# Patient Record
Sex: MALE | Race: WHITE | NOT HISPANIC OR LATINO | Employment: FULL TIME | URBAN - NONMETROPOLITAN AREA
[De-identification: names, ages, dates, MRNs, and addresses within clinical notes are randomized per-mention and may not be internally consistent; named-entity substitution may affect disease eponyms.]

---

## 2023-12-18 ENCOUNTER — OFFICE VISIT (OUTPATIENT)
Dept: FAMILY MEDICINE CLINIC | Facility: CLINIC | Age: 48
End: 2023-12-18
Payer: COMMERCIAL

## 2023-12-18 VITALS
HEIGHT: 75 IN | DIASTOLIC BLOOD PRESSURE: 100 MMHG | BODY MASS INDEX: 33.2 KG/M2 | TEMPERATURE: 97.5 F | WEIGHT: 267 LBS | HEART RATE: 76 BPM | RESPIRATION RATE: 16 BRPM | OXYGEN SATURATION: 98 % | SYSTOLIC BLOOD PRESSURE: 180 MMHG

## 2023-12-18 DIAGNOSIS — Z00.01 ENCOUNTER FOR GENERAL ADULT MEDICAL EXAMINATION WITH ABNORMAL FINDINGS: ICD-10-CM

## 2023-12-18 DIAGNOSIS — I10 PRIMARY HYPERTENSION: Primary | ICD-10-CM

## 2023-12-18 DIAGNOSIS — K21.9 GASTROESOPHAGEAL REFLUX DISEASE WITHOUT ESOPHAGITIS: ICD-10-CM

## 2023-12-18 DIAGNOSIS — E66.9 OBESITY (BMI 30.0-34.9): ICD-10-CM

## 2023-12-18 PROBLEM — E66.811 OBESITY (BMI 30.0-34.9): Status: ACTIVE | Noted: 2023-12-18

## 2023-12-18 LAB
EXPIRATION DATE: ABNORMAL
Lab: ABNORMAL
POC CREATININE URINE: 0
POC MICROALBUMIN URINE: 20

## 2023-12-18 RX ORDER — OMEPRAZOLE 40 MG/1
40 CAPSULE, DELAYED RELEASE ORAL DAILY
COMMUNITY

## 2023-12-18 RX ORDER — LOSARTAN POTASSIUM AND HYDROCHLOROTHIAZIDE 12.5; 5 MG/1; MG/1
1 TABLET ORAL DAILY
Qty: 90 TABLET | Refills: 1 | Status: SHIPPED | OUTPATIENT
Start: 2023-12-18

## 2023-12-18 RX ORDER — MULTIPLE VITAMINS W/ MINERALS TAB 9MG-400MCG
1 TAB ORAL DAILY
COMMUNITY

## 2023-12-18 NOTE — ASSESSMENT & PLAN NOTE
The patient is here for a get acquainted visit to establish care with a new provider. I have reviewed the patient's medical history in detail and updated the computerized patient record. Baseline screening tests were discussed today and pt agrees to discuss health maintenance and goals in future appointments.

## 2023-12-18 NOTE — PROGRESS NOTES
"     New Patient Office Visit      Patient Name: Christopher Cabrera  : 1975   MRN: [id number]     Chief Complaint:    Chief Complaint   Patient presents with    Annual Exam     Patient is in [facility]ice today for a new patient physical.     Elevated Blood Pressure     Patient states that he went to the dentist to get a tooth fixed but they would not fix it due to blood pressure being elevated.        History of Present Illness: Christopher Cabrera is a 48 y.o. male who is here today for complaints of hypertension and to establish care with a new provider.    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 No pertinent past medical history.    Past Surgical History: History reviewed. No pertinent surgical history.    Family History:   Family History   Problem Relation Age of Onset    Diabetes Maternal Grandfather        Social History:   Social History     Socioeconomic History    Marital status:    Tobacco Use    Smoking status: Former     Packs/day: 1.00     Years: 15.00     Additional pack years: 0.00     Total pack years: 15.00     Types: Cigarettes     Start date:      Quit date: 2023     Years since quittin.0    Smokeless tobacco: Never   Vaping Use    Vaping Use: Never used   Substance and Sexual Activity    Alcohol use: Yes     Alcohol/week: 1.0 standard drink of alcohol     Types: 1 Cans of beer per week    Drug use: Never       Medications:     Current Outpatient Medications:     losartan-hydrochlorothiazide (HYZAAR) 50-12.5 MG per tablet, Take 1 tablet by mouth Daily., Disp: 90 tablet, Rfl: 1    multivitamin with minerals tablet tablet, Take 1 tablet by " "mouth Daily., Disp: , Rfl:     omeprazole (priLOSEC) 40 MG capsule, Take 1 capsule by mouth Daily., Disp: , Rfl:     Allergies:   No Known Allergies    Objective     Physical Exam:    Vital Signs:   Vitals:    12/18/23 0952   BP: 180/100   BP Location: Right arm   Patient Position: Sitting   Cuff Size: Large Adult   Pulse: 76   Resp: 16   Temp: 97.5 °F (36.4 °C)   TempSrc: Oral   SpO2: 98%   Weight: 121 kg (267 lb)   Height: 190.5 cm (75\")   PainSc: 0-No pain     Body mass index is 33.37 kg/m².   BMI is >= 30 and <35. (Class 1 Obesity). The following options were offered after discussion;: information on healthy weight added to patient's after visit summary      Physical Exam  Vitals and nursing note reviewed.   Constitutional:       General: He is not in acute distress.     Appearance: He is not toxic-appearing.   HENT:      Head: Normocephalic and atraumatic.      Right Ear: Tympanic membrane, ear canal and external ear normal.      Left Ear: Ear canal and external ear normal. There is impacted cerumen.      Nose: Nose normal.      Mouth/Throat:      Mouth: Mucous membranes are moist.      Pharynx: No oropharyngeal exudate or posterior oropharyngeal erythema.   Eyes:      Extraocular Movements: Extraocular movements intact.      Conjunctiva/sclera: Conjunctivae normal.      Pupils: Pupils are equal, round, and reactive to light.   Cardiovascular:      Rate and Rhythm: Normal rate and regular rhythm.      Pulses: Normal pulses.      Heart sounds: Normal heart sounds.   Pulmonary:      Effort: Pulmonary effort is normal. No respiratory distress.      Breath sounds: Normal breath sounds.   Abdominal:      General: Bowel sounds are normal. There is no distension.      Palpations: Abdomen is soft.      Tenderness: There is no abdominal tenderness.   Musculoskeletal:         General: Normal range of motion.      Cervical back: Normal range of motion and neck supple. No tenderness.      Right lower leg: No edema.      " Left lower leg: No edema.   Lymphadenopathy:      Cervical: No cervical adenopathy.   Skin:     General: Skin is warm and dry.   Neurological:      Mental Status: He is alert and oriented to person, place, and time.      Motor: No weakness.      Gait: Gait normal.   Psychiatric:         Mood and Affect: Mood normal.         Behavior: Behavior normal.           PHQ-9 Total Score: 0     Assessment / Plan      Assessment/Plan:   Diagnoses and all orders for this visit:    1. Primary hypertension (Primary)  Assessment & Plan:  Hypertension is newly identified.  Medication changes per orders.  Blood pressure will be reassessed in 4 weeks.  Patient reports symptoms of flushed face, headaches, etc. and he has suspected he has had hypertension for quite some time.    Orders:  -     POC Microalbumin  -     TSH Rfx On Abnormal To Free T4  -     losartan-hydrochlorothiazide (HYZAAR) 50-12.5 MG per tablet; Take 1 tablet by mouth Daily.  Dispense: 90 tablet; Refill: 1    2. Gastroesophageal reflux disease without esophagitis  Assessment & Plan:  Pt currently taking omeprazole 40 mg daily and reports good symptom control this medication  No change in treatment plan today    3. Encounter for general adult medical examination with abnormal findings  Assessment & Plan:  The patient is here for a get acquainted visit to establish care with a new provider. I have reviewed the patient's medical history in detail and updated the computerized patient record. Baseline screening tests were discussed today and pt agrees to discuss health maintenance and goals in future appointments.     Orders:  -     CBC Auto Differential  -     Comprehensive Metabolic Panel  -     Lipid Panel    4. Obesity (BMI 30.0-34.9)  Assessment & Plan:  Patient's (Body mass index is 33.37 kg/m².) indicates that they are obese (BMI >30) with health conditions that include hypertension and GERD . Weight is newly identified. BMI  is above average; BMI management plan  is completed. We discussed portion control, increasing exercise, decreasing alcohol consumption, and Information on healthy weight added to patient's after visit summary.     Orders:  -     Hemoglobin A1c    Patient education materials attached to AVS. Materials were reviewed with patient during their office visit today and all questions addressed.       Follow Up:   Return in about 1 month (around 1/18/2024) for Recheck.    LEDA Hernandes (Libby)  [facility] [place] [address]  Phone: [phone]  Fax: [phone]

## 2023-12-18 NOTE — ASSESSMENT & PLAN NOTE
Hypertension is newly identified.  Medication changes per orders.  Blood pressure will be reassessed in 4 weeks.  Patient reports symptoms of flushed face, headaches, etc. and he has suspected he has had hypertension for quite some time.

## 2023-12-18 NOTE — ASSESSMENT & PLAN NOTE
Pt currently taking omeprazole 40 mg daily and reports good symptom control this medication  No change in treatment plan today

## 2023-12-18 NOTE — PATIENT INSTRUCTIONS

## 2023-12-18 NOTE — ASSESSMENT & PLAN NOTE
Patient's (Body mass index is 33.37 kg/m².) indicates that they are obese (BMI >30) with health conditions that include hypertension and GERD . Weight is newly identified. BMI  is above average; BMI management plan is completed. We discussed portion control, increasing exercise, decreasing alcohol consumption, and Information on healthy weight added to patient's after visit summary.

## 2023-12-19 LAB
ALBUMIN SERPL-MCNC: 4.7 G/DL (ref 4.1–5.1)
ALBUMIN/GLOB SERPL: 2.1 {RATIO} (ref 1.2–2.2)
ALP SERPL-CCNC: 77 IU/L (ref 44–121)
ALT SERPL-CCNC: 15 IU/L (ref 0–44)
AST SERPL-CCNC: 14 IU/L (ref 0–40)
BASOPHILS # BLD AUTO: 0 X10E3/UL (ref 0–0.2)
BASOPHILS NFR BLD AUTO: 0 %
BILIRUB SERPL-MCNC: 0.9 MG/DL (ref 0–1.2)
BUN SERPL-MCNC: 11 MG/DL (ref 6–24)
BUN/CREAT SERPL: 14 (ref 9–20)
CALCIUM SERPL-MCNC: 8.9 MG/DL (ref 8.7–10.2)
CHLORIDE SERPL-SCNC: 101 MMOL/L (ref 96–106)
CHOLEST SERPL-MCNC: 204 MG/DL (ref 100–199)
CO2 SERPL-SCNC: 21 MMOL/L (ref 20–29)
CREAT SERPL-MCNC: 0.76 MG/DL (ref 0.76–1.27)
EGFRCR SERPLBLD CKD-EPI 2021: 109 ML/MIN/1.73
EOSINOPHIL # BLD AUTO: 0.1 X10E3/UL (ref 0–0.4)
EOSINOPHIL NFR BLD AUTO: 1 %
ERYTHROCYTE [DISTWIDTH] IN BLOOD BY AUTOMATED COUNT: 12.1 % (ref 11.6–15.4)
GLOBULIN SER CALC-MCNC: 2.2 G/DL (ref 1.5–4.5)
GLUCOSE SERPL-MCNC: 108 MG/DL (ref 70–99)
HBA1C MFR BLD: 5.6 % (ref 4.8–5.6)
HCT VFR BLD AUTO: 45.8 % (ref 37.5–51)
HDLC SERPL-MCNC: 72 MG/DL
HGB BLD-MCNC: 15.5 G/DL (ref 13–17.7)
IMM GRANULOCYTES # BLD AUTO: 0 X10E3/UL (ref 0–0.1)
IMM GRANULOCYTES NFR BLD AUTO: 0 %
LDLC SERPL CALC-MCNC: 118 MG/DL (ref 0–99)
LYMPHOCYTES # BLD AUTO: 2 X10E3/UL (ref 0.7–3.1)
LYMPHOCYTES NFR BLD AUTO: 30 %
MCH RBC QN AUTO: 32 PG (ref 26.6–33)
MCHC RBC AUTO-ENTMCNC: 33.8 G/DL (ref 31.5–35.7)
MCV RBC AUTO: 94 FL (ref 79–97)
MONOCYTES # BLD AUTO: 0.6 X10E3/UL (ref 0.1–0.9)
MONOCYTES NFR BLD AUTO: 9 %
NEUTROPHILS # BLD AUTO: 4 X10E3/UL (ref 1.4–7)
NEUTROPHILS NFR BLD AUTO: 60 %
PLATELET # BLD AUTO: 224 X10E3/UL (ref 150–450)
POTASSIUM SERPL-SCNC: 4.3 MMOL/L (ref 3.5–5.2)
PROT SERPL-MCNC: 6.9 G/DL (ref 6–8.5)
RBC # BLD AUTO: 4.85 X10E6/UL (ref 4.14–5.8)
SODIUM SERPL-SCNC: 138 MMOL/L (ref 134–144)
TRIGL SERPL-MCNC: 81 MG/DL (ref 0–149)
TSH SERPL DL<=0.005 MIU/L-ACNC: 1.68 UIU/ML (ref 0.45–4.5)
VLDLC SERPL CALC-MCNC: 14 MG/DL (ref 5–40)
WBC # BLD AUTO: 6.7 X10E3/UL (ref 3.4–10.8)

## 2024-01-18 ENCOUNTER — OFFICE VISIT (OUTPATIENT)
Dept: FAMILY MEDICINE CLINIC | Facility: CLINIC | Age: 49
End: 2024-01-18
Payer: COMMERCIAL

## 2024-01-18 ENCOUNTER — TELEPHONE (OUTPATIENT)
Dept: FAMILY MEDICINE CLINIC | Facility: CLINIC | Age: 49
End: 2024-01-18

## 2024-01-18 VITALS
HEIGHT: 75 IN | BODY MASS INDEX: 33.81 KG/M2 | OXYGEN SATURATION: 98 % | RESPIRATION RATE: 18 BRPM | SYSTOLIC BLOOD PRESSURE: 140 MMHG | HEART RATE: 76 BPM | DIASTOLIC BLOOD PRESSURE: 88 MMHG | WEIGHT: 271.9 LBS

## 2024-01-18 DIAGNOSIS — M10.9 ACUTE GOUT OF RIGHT FOOT, UNSPECIFIED CAUSE: Primary | ICD-10-CM

## 2024-01-18 DIAGNOSIS — I10 PRIMARY HYPERTENSION: ICD-10-CM

## 2024-01-18 DIAGNOSIS — E78.2 MODERATE MIXED HYPERLIPIDEMIA NOT REQUIRING STATIN THERAPY: ICD-10-CM

## 2024-01-18 RX ORDER — COLCHICINE 0.6 MG/1
TABLET ORAL
Qty: 3 TABLET | Refills: 1 | Status: SHIPPED | OUTPATIENT
Start: 2024-01-18

## 2024-01-18 RX ORDER — COLCHICINE 0.6 MG/1
0.6 TABLET ORAL DAILY
Qty: 30 TABLET | Refills: 0 | Status: CANCELLED | OUTPATIENT
Start: 2024-01-18

## 2024-01-18 RX ORDER — LOSARTAN POTASSIUM AND HYDROCHLOROTHIAZIDE 12.5; 1 MG/1; MG/1
1 TABLET ORAL DAILY
Qty: 30 TABLET | Refills: 1 | Status: SHIPPED | OUTPATIENT
Start: 2024-01-18

## 2024-01-18 NOTE — TELEPHONE ENCOUNTER
Patient called in needing gout medication and an increased blood pressure medication sent in to [facility].

## 2024-01-18 NOTE — PATIENT INSTRUCTIONS
The American Heart Association Diet and Lifestyle Recommendations  A healthy diet and lifestyle are the keys to preventing and managing cardiovascular disease. It’s not as hard as you may think!  Remember, it's the overall pattern of your choices that counts. Make the simple steps below part of your life for long-term benefits to your health and your heart.    Use up at least as many calories as you take in.  Start by knowing how many calories you should be eating and drinking to maintain your weight. Nutrition and calorie information on food labels is typically based on a 2,000 calorie per day diet. You may need fewer or more calories depending on several factors including age, gender, and level of physical activity.    Increase the amount and intensity of your physical activity to burn more calories.    Aim for at least 150 minutes of moderate physical activity or 75 minutes of vigorous physical activity (or an equal combination of both) each week.  Regular physical activity can help you maintain your weight, keep off weight that you lose and reach physical and cardiovascular fitness. If it’s hard to schedule regular exercise, look for ways to build short bursts of activity into your daily routine such as parking farther away and taking the stairs instead of the elevator. Ideally, your activity should be spread throughout the week.    Eat an overall healthy dietary pattern that emphasizes:  a wide variety of fruits and vegetables  whole grains and products made up mostly of whole grains  healthy sources of protein (mostly plants such as legumes and nuts; fish and seafood; low-fat or nonfat dairy; and, if you eat meat and poultry, ensuring it is lean and unprocessed)  liquid non-tropical vegetable oils  minimally processed foods  minimized intake of added sugars  foods prepared with little or no salt  limited or preferably no alcohol intake  Apply this guidance wherever food is prepared or consumed.  It is  possible to follow a heart-healthy dietary pattern regardless of whether food is prepared at home, ordered in a restaurant or online, or purchased as a prepared meal. Read the Nutrition Facts and ingredient list on packaged food labels to choose those with less sodium, added sugars and saturated fat. Look for the Heart-Check yarely to find foods that have been certified by the American Heart Association as heart-healthy.     Live Tobacco Free  Don’t smoke, vape or use tobacco or nicotine products -- and avoid secondhand smoke or vapor.

## 2024-01-18 NOTE — ASSESSMENT & PLAN NOTE
Hypertension is  not at goal .  Medication changes per orders.  Blood pressure will be reassessed in 4 weeks.

## 2024-01-18 NOTE — PROGRESS NOTES
Follow Up Office Visit      Date:  2024   Patient Name: Christopher Cabrera  : 1975   MRN: [id number]     Chief Complaint:    Chief Complaint   Patient presents with    Hypertension     Follow up       History of Present Illness: Christopher Cabrera is a 48 y.o. male who is here today for routine follow-up related to hypertension and patient also has concerns about right foot pain and gout.  Patient denies fevers, chills, or other constitutional symptoms and has no other questions or concerns today.    Patient requests gout medication   Increase bp med  Risk factors - diet/red meat and beer      Answers submitted by the patient for this visit:  Office Visit on 2024 10:00 AM with Bhargavi Bradford (Submitted on 2024)  Please describe your symptoms.: Follow up  Have you had these symptoms before?: No  How long have you been having these symptoms?: Greater than 2 weeks  Please describe any probable cause for these symptoms. : Blood pressure      Subjective      Past Medical History: History reviewed. No pertinent past medical history.    Past Surgical History: History reviewed. No pertinent surgical history.    Family History:   Family History   Problem Relation Age of Onset    Diabetes Maternal Grandfather        Social History:   Social History     Socioeconomic History    Marital status:    Tobacco Use    Smoking status: Former     Packs/day: 1.00     Years: 15.00     Additional pack years: 0.00     Total pack years: 15.00     Types: Cigarettes     Start date:      Quit date: 2023     Years since quittin.1    Smokeless tobacco: Never   Vaping Use    Vaping Use: Never used   Substance and Sexual Activity    Alcohol use: Yes     Alcohol/week: 1.0 standard drink of alcohol     Types: 1 Cans of beer per week    Drug use: Never       Medications:     Current Outpatient Medications:     multivitamin with minerals tablet tablet, Take 1 tablet by mouth Daily., Disp: , Rfl:      "omeprazole (priLOSEC) 40 MG capsule, Take 1 capsule by mouth Daily., Disp: , Rfl:     colchicine 0.6 MG tablet, Take 1.2 mg PO x 1 dose (2 tablets), then 0.6 mg PO 1 hour later x 1 dose (1 tablet). Max: 1.8 mg total dose/treatment course (3 tablets). Do not repeat for at least 3 days., Disp: 3 tablet, Rfl: 1    losartan-hydrochlorothiazide (HYZAAR) 100-12.5 MG per tablet, Take 1 tablet by mouth Daily., Disp: 30 tablet, Rfl: 1    Allergies:   No Known Allergies    Objective     Physical Exam  Vitals and nursing note reviewed.   Constitutional:       General: He is not in acute distress.     Appearance: Normal appearance. He is not toxic-appearing.   HENT:      Head: Normocephalic and atraumatic.   Cardiovascular:      Rate and Rhythm: Normal rate and regular rhythm.      Pulses: Normal pulses.      Heart sounds: Normal heart sounds.   Pulmonary:      Effort: Pulmonary effort is normal. No respiratory distress.      Breath sounds: Normal breath sounds.   Musculoskeletal:         General: Normal range of motion.      Right foot: Normal range of motion.      Left foot: Normal range of motion.   Feet:      Right foot:      Skin integrity: Skin integrity normal.      Left foot:      Skin integrity: Skin integrity normal.      Comments: Arch of right foot and ankle mild tenderness to touch, minimal erythema, normal temperature.  Left foot no abnormality.  Skin:     General: Skin is warm and dry.   Neurological:      General: No focal deficit present.      Mental Status: He is alert and oriented to person, place, and time.      Motor: No weakness.      Coordination: Coordination normal.   Psychiatric:         Mood and Affect: Mood normal.         Behavior: Behavior normal.       Vitals:    01/18/24 1005   BP: 140/88   BP Location: Left arm   Patient Position: Sitting   Cuff Size: Large Adult   Pulse: 76   Resp: 18   SpO2: 98%   Weight: 123 kg (271 lb 14.4 oz)   Height: 190.5 cm (75\")   PainSc: 0-No pain     Body mass index " is 33.99 kg/m².           The 10-year ASCVD risk score (Alex PERALTA, et al., 2019) is: 3.3%    Values used to calculate the score:      Age: 48 years      Sex: Male      Is Non- : No      Diabetic: No      Tobacco smoker: No      Systolic Blood Pressure: 140 mmHg      Is BP treated: Yes      HDL Cholesterol: 72 mg/dL      Total Cholesterol: 204 mg/dL    PHQ-9 Total Score:       Assessment / Plan      Assessment/Plan:   Diagnoses and all orders for this visit:    1. Acute gout of right foot, unspecified cause (Primary)  Assessment & Plan:  Gout flare arch of right foot into ankle.  Patient reports symptoms for the last 2 to 3 days increasing in severity.  Patient reports taking anti-inflammatory medication with little relief.  Medication changes per orders  Patient agrees to return if no improvement or worsening symptoms or if he develops constitutional symptoms i.e. fever, chills, etc.  Patient agrees to take over-the-counter NSAID  Will reassess symptoms at next follow-up visit.  Patient education materials attached to AVS - gout and prevention of gout. Materials were reviewed with patient during their office visit today and all questions addressed.    Orders:  -     colchicine 0.6 MG tablet; Take 1.2 mg PO x 1 dose (2 tablets), then 0.6 mg PO 1 hour later x 1 dose (1 tablet). Max: 1.8 mg total dose/treatment course (3 tablets). Do not repeat for at least 3 days.  Dispense: 3 tablet; Refill: 1    2. Primary hypertension  Assessment & Plan:  Hypertension is  not at goal .  Medication changes per orders.  Blood pressure will be reassessed in 4 weeks.    Orders:  -     losartan-hydrochlorothiazide (HYZAAR) 100-12.5 MG per tablet; Take 1 tablet by mouth Daily.  Dispense: 30 tablet; Refill: 1    3. Moderate mixed hyperlipidemia not requiring statin therapy    Follow Up:   Return in about 4 weeks (around 2/15/2024).    LEDA Hernandes (Libby)  [facility] [address] [place] [address]

## 2024-01-18 NOTE — ASSESSMENT & PLAN NOTE
Gout flare arch of right foot into ankle.  Patient reports symptoms for the last 2 to 3 days increasing in severity.  Patient reports taking anti-inflammatory medication with little relief.  Medication changes per orders  Patient agrees to return if no improvement or worsening symptoms or if he develops constitutional symptoms i.e. fever, chills, etc.  Patient agrees to take over-the-counter NSAID  Will reassess symptoms at next follow-up visit.  Patient education materials attached to AVS - gout and prevention of gout. Materials were reviewed with patient during their office visit today and all questions addressed.

## 2024-02-02 ENCOUNTER — TELEPHONE (OUTPATIENT)
Dept: FAMILY MEDICINE CLINIC | Facility: CLINIC | Age: 49
End: 2024-02-02
Payer: COMMERCIAL

## 2024-02-02 NOTE — TELEPHONE ENCOUNTER
Dental office called asking to speak to MA or provider to okay a root canal. Dental office states that the patients blood pressure is 162/107 and they are concerned that the epi in the anaesthetic will make his blood pressure increase. Spoke with Floresita Potter and Dr. Velasquez. Floresita potter advised dental office that if they can do the procedure without epi then that would be preferred. Provider also advised that the patients blood pressure may be increased due to the patient needing the procedure. Provider advised the okay to go ahead with the procedure.

## 2024-02-15 ENCOUNTER — OFFICE VISIT (OUTPATIENT)
Dept: FAMILY MEDICINE CLINIC | Facility: CLINIC | Age: 49
End: 2024-02-15
Payer: COMMERCIAL

## 2024-02-15 VITALS
OXYGEN SATURATION: 98 % | WEIGHT: 268.2 LBS | HEART RATE: 94 BPM | DIASTOLIC BLOOD PRESSURE: 90 MMHG | HEIGHT: 75 IN | SYSTOLIC BLOOD PRESSURE: 120 MMHG | RESPIRATION RATE: 18 BRPM | BODY MASS INDEX: 33.35 KG/M2

## 2024-02-15 DIAGNOSIS — Z11.59 NEED FOR HEPATITIS C SCREENING TEST: ICD-10-CM

## 2024-02-15 DIAGNOSIS — I10 PRIMARY HYPERTENSION: ICD-10-CM

## 2024-02-15 DIAGNOSIS — Z12.11 ENCOUNTER FOR SCREENING FOR MALIGNANT NEOPLASM OF COLON: ICD-10-CM

## 2024-02-15 DIAGNOSIS — N52.9 ERECTILE DYSFUNCTION, UNSPECIFIED ERECTILE DYSFUNCTION TYPE: Primary | ICD-10-CM

## 2024-02-15 DIAGNOSIS — K21.9 GASTROESOPHAGEAL REFLUX DISEASE WITHOUT ESOPHAGITIS: ICD-10-CM

## 2024-02-15 RX ORDER — LOSARTAN POTASSIUM 100 MG/1
100 TABLET ORAL DAILY
Qty: 30 TABLET | Refills: 3 | Status: SHIPPED | OUTPATIENT
Start: 2024-02-15

## 2024-02-15 RX ORDER — AMLODIPINE BESYLATE 5 MG/1
5 TABLET ORAL DAILY
Qty: 30 TABLET | Refills: 3 | Status: SHIPPED | OUTPATIENT
Start: 2024-02-15

## 2024-02-15 RX ORDER — OMEPRAZOLE 40 MG/1
40 CAPSULE, DELAYED RELEASE ORAL DAILY
Qty: 30 CAPSULE | Refills: 3 | Status: SHIPPED | OUTPATIENT
Start: 2024-02-15

## 2024-02-15 NOTE — PATIENT INSTRUCTIONS

## 2024-02-15 NOTE — PROGRESS NOTES
Follow Up Office Visit      Date:  02/15/2024   Patient Name: Christopher Cabrera  : 1975   MRN: [id number]     Chief Complaint:    Chief Complaint   Patient presents with    Hypertension     Follow up        History of Present Illness: Christopher Cabrera is a 48 y.o. male who is here today to follow up for re-check of HTN. Pt is also concerned about erectile dysfunction.     Decrease DBP   Clearance for dental procedure  Endomethacin refill for gout flares - 2-4 times a year average     Erectile Dysfunction: Patient complains of erectile dysfunction.  Onset of dysfunction was 3 months ago and was gradual in onset.  Patient states the nature of difficulty is both attaining and maintaining erection. Full erections occur never. Partial erections occur with intercourse. Libido is not affected. Risk factors for ED include cardiovascular disease and antihypertensive medications,      Subjective      Past Medical History: History reviewed. No pertinent past medical history.    Past Surgical History: History reviewed. No pertinent surgical history.    Family History:   Family History   Problem Relation Age of Onset    Diabetes Maternal Grandfather        Social History:   Social History     Socioeconomic History    Marital status:    Tobacco Use    Smoking status: Former     Current packs/day: 0.00     Average packs/day: 1 pack/day for 15.9 years (15.9 ttl pk-yrs)     Types: Cigarettes     Start date:      Quit date: 2023     Years since quittin.2    Smokeless tobacco: Never   Vaping Use    Vaping status: Never Used   Substance and Sexual Activity    Alcohol use: Yes     Alcohol/week: 1.0 standard drink of alcohol     Types: 1 Cans of beer per week    Drug use: Never       Medications:     Current Outpatient Medications:     colchicine 0.6 MG tablet, Take 1.2 mg PO x 1 dose (2 tablets), then 0.6 mg PO 1 hour later x 1 dose (1 tablet). Max: 1.8 mg total dose/treatment course (3 tablets). Do not repeat  "for at least 3 days., Disp: 3 tablet, Rfl: 1    multivitamin with minerals tablet tablet, Take 1 tablet by mouth Daily., Disp: , Rfl:     omeprazole (priLOSEC) 40 MG capsule, Take 1 capsule by mouth Daily., Disp: 30 capsule, Rfl: 3    amLODIPine (NORVASC) 5 MG tablet, Take 1 tablet by mouth Daily., Disp: 30 tablet, Rfl: 3    losartan (COZAAR) 100 MG tablet, Take 1 tablet by mouth Daily., Disp: 30 tablet, Rfl: 3    Allergies:   No Known Allergies    Objective     Physical Exam  Vitals:    02/15/24 0949   BP: 120/90   BP Location: Left arm   Patient Position: Sitting   Cuff Size: Large Adult   Pulse: 94   Resp: 18   SpO2: 98%   Weight: 122 kg (268 lb 3.2 oz)   Height: 190.5 cm (75\")   PainSc: 0-No pain     Body mass index is 33.52 kg/m².          The 10-year ASCVD risk score (Alex PERALTA, et al., 2019) is: 2.5%    Values used to calculate the score:      Age: 48 years      Sex: Male      Is Non- : No      Diabetic: No      Tobacco smoker: No      Systolic Blood Pressure: 120 mmHg      Is BP treated: Yes      HDL Cholesterol: 72 mg/dL      Total Cholesterol: 204 mg/dL    PHQ-9 Total Score:       Assessment / Plan      Assessment/Plan:   Diagnoses and all orders for this visit:    1. Erectile dysfunction, unspecified erectile dysfunction type (Primary)  -     Testosterone; Future    2. Gastroesophageal reflux disease without esophagitis  Assessment & Plan:  Patient reports adequate symptom control on current treatment regimen.  No change in treatment regimen today, will reevaluate at next routine follow-up.  Refill per order    Orders:  -     omeprazole (priLOSEC) 40 MG capsule; Take 1 capsule by mouth Daily.  Dispense: 30 capsule; Refill: 3    3. Encounter for screening for malignant neoplasm of colon  -     Ambulatory Referral For Screening Colonoscopy    4. Need for hepatitis C screening test  -     Hepatitis C Antibody; Future    5. Primary hypertension  Assessment & Plan:  Hypertension is  " improving with treatment.  Medication changes per orders.  Blood pressure will be reassessedin 4 weeks.    Orders:  -     losartan (COZAAR) 100 MG tablet; Take 1 tablet by mouth Daily.  Dispense: 30 tablet; Refill: 3  -     amLODIPine (NORVASC) 5 MG tablet; Take 1 tablet by mouth Daily.  Dispense: 30 tablet; Refill: 3       Follow Up:   Return in about 4 weeks (around 3/14/2024) for Recheck.    LEDA Hernandes (Libby)  [facility] [address] [address]

## 2024-02-15 NOTE — ASSESSMENT & PLAN NOTE
Hypertension is  improving with treatment.  Medication changes per orders.  Blood pressure will be reassessedin 4 weeks.

## 2024-02-15 NOTE — LETTER
February 15, 2024       Patient: Christopher Cabrera   YOB: 1975   Date of Visit: 2/15/2024       To Whom It May Concern,    Christopher Cabrera was in my office today for a routine follow-up.  We are currently treating his hypertension and have been titrating medications with a goal of keeping his blood pressure < 130/80 to prevent target organ damage and other complications related to chronic, poorly controlled hypertension.  As long as the patient is compliant with his antihypertensive medications and he meets your blood pressure screening criteria on the day of his procedure, he should be at no greater risk during the procedure.     If you have questions, please do not hesitate to call me. I look forward to following Christopher along with you.         Sincerely,        LEDA Abraham        CC:   No Recipients

## 2024-02-22 ENCOUNTER — TELEPHONE (OUTPATIENT)
Dept: FAMILY MEDICINE CLINIC | Facility: CLINIC | Age: 49
End: 2024-02-22
Payer: COMMERCIAL

## 2024-02-22 NOTE — TELEPHONE ENCOUNTER
Called patient and advised that letter for the dentist was sent through [employer]. Advised patient that if he cannot access it to give us a call back and I can print it off for him to . Patient voiced understanding.

## 2024-03-04 NOTE — ASSESSMENT & PLAN NOTE
Patient reports adequate symptom control on current treatment regimen.  No change in treatment regimen today, will reevaluate at next routine follow-up.  Refill per order

## 2024-03-06 ENCOUNTER — TELEPHONE (OUTPATIENT)
Dept: FAMILY MEDICINE CLINIC | Facility: CLINIC | Age: 49
End: 2024-03-06
Payer: COMMERCIAL

## 2024-03-06 NOTE — TELEPHONE ENCOUNTER
Patient's original call was request for indomethacin for gout flares.  In discussing this with patient on the phone he tells me has had some increased swelling in his ankles since changing antihypertensive medications at his last visit.  Patient is planning to come to the office on 3/9/2024 for routine follow-up lab work and has appointment to be seen the following week.  Plan is to evaluate lab work and make medication adjustments at follow-up appointment.  Patient agrees to follow-up earlier if he has additional concerns.  We will discuss gout and indomethacin at his appointment, patient verbalized understanding.

## 2024-03-08 ENCOUNTER — LAB (OUTPATIENT)
Dept: FAMILY MEDICINE CLINIC | Facility: CLINIC | Age: 49
End: 2024-03-08
Payer: COMMERCIAL

## 2024-03-08 DIAGNOSIS — N52.9 ERECTILE DYSFUNCTION, UNSPECIFIED ERECTILE DYSFUNCTION TYPE: ICD-10-CM

## 2024-03-08 DIAGNOSIS — Z11.59 NEED FOR HEPATITIS C SCREENING TEST: ICD-10-CM

## 2024-03-09 LAB
HCV IGG SERPL QL IA: NON REACTIVE
TESTOST SERPL-MCNC: 398 NG/DL (ref 264–916)

## 2024-03-14 ENCOUNTER — OFFICE VISIT (OUTPATIENT)
Dept: FAMILY MEDICINE CLINIC | Facility: CLINIC | Age: 49
End: 2024-03-14
Payer: COMMERCIAL

## 2024-03-14 VITALS
OXYGEN SATURATION: 100 % | SYSTOLIC BLOOD PRESSURE: 160 MMHG | BODY MASS INDEX: 34.06 KG/M2 | RESPIRATION RATE: 16 BRPM | TEMPERATURE: 98.3 F | DIASTOLIC BLOOD PRESSURE: 94 MMHG | HEART RATE: 84 BPM | HEIGHT: 75 IN | WEIGHT: 273.9 LBS

## 2024-03-14 DIAGNOSIS — I10 PRIMARY HYPERTENSION: Primary | ICD-10-CM

## 2024-03-14 PROCEDURE — 36415 COLL VENOUS BLD VENIPUNCTURE: CPT

## 2024-03-14 RX ORDER — CETIRIZINE HYDROCHLORIDE 10 MG/1
1 TABLET ORAL DAILY
COMMUNITY

## 2024-03-14 RX ORDER — FUROSEMIDE 20 MG/1
20 TABLET ORAL DAILY
Qty: 10 TABLET | Refills: 0 | Status: SHIPPED | OUTPATIENT
Start: 2024-03-14

## 2024-03-14 NOTE — PROGRESS NOTES
Follow Up Office Visit      Date:  2024   Patient Name: Christopher Cabrera  : 1975   MRN: [id number]     Chief Complaint:    Chief Complaint   Patient presents with    Hypertension     Follow up        History of Present Illness: Christopher Cabrera is a 49 y.o. male who is here today for blood pressure recheck.  Patient's medications were changed at his last visit and today he has experienced increased swelling in his lower extremities and blood pressure is increased.  Patient denies fevers, chills, or other constitutional symptoms and has no other questions or concerns at this time.    Notes:  Increased swelling in bilateral ankles  Patient was previously taken off hydrochlorothiazide following gout flare attributed to HCTZ      Subjective      Past Medical History: History reviewed. No pertinent past medical history.    Past Surgical History: History reviewed. No pertinent surgical history.    Family History:   Family History   Problem Relation Age of Onset    Diabetes Maternal Grandfather        Social History:   Social History     Socioeconomic History    Marital status:    Tobacco Use    Smoking status: Former     Current packs/day: 0.00     Average packs/day: 1 pack/day for 15.9 years (15.9 ttl pk-yrs)     Types: Cigarettes     Start date:      Quit date: 2023     Years since quittin.3    Smokeless tobacco: Never   Vaping Use    Vaping status: Never Used   Substance and Sexual Activity    Alcohol use: Yes     Alcohol/week: 1.0 standard drink of alcohol     Types: 1 Cans of beer per week    Drug use: Never       Medications:     Current Outpatient Medications:     colchicine 0.6 MG tablet, Take 1.2 mg PO x 1 dose (2 tablets), then 0.6 mg PO 1 hour later x 1 dose (1 tablet). Max: 1.8 mg total dose/treatment course (3 tablets). Do not repeat for at least 3 days., Disp: 3 tablet, Rfl: 1    losartan (COZAAR) 100 MG tablet, Take 1 tablet by mouth Daily. (Patient not taking: Reported on  3/27/2024), Disp: 30 tablet, Rfl: 3    omeprazole (priLOSEC) 40 MG capsule, Take 1 capsule by mouth Daily., Disp: 30 capsule, Rfl: 3    cetirizine (ZyrTEC Allergy) 10 MG tablet, Take 1 tablet by mouth Daily., Disp: , Rfl:     furosemide (Lasix) 20 MG tablet, Take 1 tablet by mouth Daily., Disp: 10 tablet, Rfl: 0    metoprolol tartrate (LOPRESSOR) 25 MG tablet, Take 1 tablet by mouth 2 (Two) Times a Day., Disp: 60 tablet, Rfl: 1    multivitamin with minerals (MULTIVITAMIN ADULT PO), Take 1 tablet by mouth Daily., Disp: , Rfl:     Allergies:   No Known Allergies    Objective     Physical Exam  Vitals and nursing note reviewed.   Constitutional:       General: He is not in acute distress.     Appearance: Normal appearance. He is not toxic-appearing.   HENT:      Head: Normocephalic and atraumatic.      Right Ear: Tympanic membrane, ear canal and external ear normal.      Left Ear: Tympanic membrane, ear canal and external ear normal.   Eyes:      Pupils: Pupils are equal, round, and reactive to light.   Cardiovascular:      Rate and Rhythm: Normal rate and regular rhythm.      Pulses: Normal pulses.      Heart sounds: Normal heart sounds. No murmur heard.     No gallop.   Pulmonary:      Effort: Pulmonary effort is normal. No respiratory distress.      Breath sounds: Normal breath sounds.   Musculoskeletal:         General: Normal range of motion.   Skin:     General: Skin is warm and dry.   Neurological:      General: No focal deficit present.      Mental Status: He is alert and oriented to person, place, and time.      Motor: No weakness.      Coordination: Coordination normal.   Psychiatric:         Mood and Affect: Mood normal.         Behavior: Behavior normal.       Vitals:    03/14/24 1002   BP: 160/94   BP Location: Right arm   Patient Position: Sitting   Cuff Size: Large Adult   Pulse: 84   Resp: 16   Temp: 98.3 °F (36.8 °C)   TempSrc: Oral   SpO2: 100%   Weight: 124 kg (273 lb 14.4 oz)   Height: 190.5 cm  "(75\")   PainSc: 0-No pain     Body mass index is 34.24 kg/m².          The 10-year ASCVD risk score (Alex PERALTA, et al., 2019) is: 3.8%    Values used to calculate the score:      Age: 49 years      Sex: Male      Is Non- : No      Diabetic: No      Tobacco smoker: No      Systolic Blood Pressure: 142 mmHg      Is BP treated: Yes      HDL Cholesterol: 72 mg/dL      Total Cholesterol: 204 mg/dL      Assessment / Plan      Assessment/Plan:   Diagnoses and all orders for this visit:    1. Primary hypertension (Primary)  Assessment & Plan:  Hypertension is uncontrolled after making medication adjustment at last visit  Medication changes per orders.  Ambulatory blood pressure monitoring.  Blood pressure will be reassessedin 2 weeks.    Orders:  -     Comprehensive Metabolic Panel  -     CBC Auto Differential  -     metoprolol tartrate (LOPRESSOR) 25 MG tablet; Take 1 tablet by mouth 2 (Two) Times a Day.  Dispense: 60 tablet; Refill: 1  -     furosemide (Lasix) 20 MG tablet; Take 1 tablet by mouth Daily.  Dispense: 10 tablet; Refill: 0      Most recent diagnostic testing results were reviewed and discussed with the patient during their appointment today and all questions addressed to patient's satisfaction.     Follow Up:   Return in about 2 weeks (around 3/28/2024) for Recheck.    LEDA Hernandes (Libby)  [facility] [place] [facility] [address] [address]    NOTE TO PATIENT: The 21st Century Cures Act makes medical notes like these available to patients in the interest of transparency. However, be advised this is a medical document. It is intended as peer to peer communication. It is written in medical language and may contain abbreviations or verbiage that are unfamiliar. It may appear blunt or direct. Medical documents are intended to carry relevant information, facts as evident, and the clinical opinion of the practitioner.     "

## 2024-03-15 LAB
ALBUMIN SERPL-MCNC: 4.3 G/DL (ref 4.1–5.1)
ALBUMIN/GLOB SERPL: 2 {RATIO} (ref 1.2–2.2)
ALP SERPL-CCNC: 67 IU/L (ref 44–121)
ALT SERPL-CCNC: 19 IU/L (ref 0–44)
AST SERPL-CCNC: 16 IU/L (ref 0–40)
BASOPHILS # BLD AUTO: 0 X10E3/UL (ref 0–0.2)
BASOPHILS NFR BLD AUTO: 0 %
BILIRUB SERPL-MCNC: 0.6 MG/DL (ref 0–1.2)
BUN SERPL-MCNC: 11 MG/DL (ref 6–24)
BUN/CREAT SERPL: 13 (ref 9–20)
CALCIUM SERPL-MCNC: 9.2 MG/DL (ref 8.7–10.2)
CHLORIDE SERPL-SCNC: 104 MMOL/L (ref 96–106)
CO2 SERPL-SCNC: 22 MMOL/L (ref 20–29)
CREAT SERPL-MCNC: 0.86 MG/DL (ref 0.76–1.27)
EGFRCR SERPLBLD CKD-EPI 2021: 105 ML/MIN/1.73
EOSINOPHIL # BLD AUTO: 0.1 X10E3/UL (ref 0–0.4)
EOSINOPHIL NFR BLD AUTO: 2 %
ERYTHROCYTE [DISTWIDTH] IN BLOOD BY AUTOMATED COUNT: 12.9 % (ref 11.6–15.4)
GLOBULIN SER CALC-MCNC: 2.2 G/DL (ref 1.5–4.5)
GLUCOSE SERPL-MCNC: 103 MG/DL (ref 70–99)
HCT VFR BLD AUTO: 43.9 % (ref 37.5–51)
HGB BLD-MCNC: 14.5 G/DL (ref 13–17.7)
IMM GRANULOCYTES # BLD AUTO: 0 X10E3/UL (ref 0–0.1)
IMM GRANULOCYTES NFR BLD AUTO: 0 %
LYMPHOCYTES # BLD AUTO: 2 X10E3/UL (ref 0.7–3.1)
LYMPHOCYTES NFR BLD AUTO: 31 %
MCH RBC QN AUTO: 32.4 PG (ref 26.6–33)
MCHC RBC AUTO-ENTMCNC: 33 G/DL (ref 31.5–35.7)
MCV RBC AUTO: 98 FL (ref 79–97)
MONOCYTES # BLD AUTO: 0.6 X10E3/UL (ref 0.1–0.9)
MONOCYTES NFR BLD AUTO: 10 %
NEUTROPHILS # BLD AUTO: 3.5 X10E3/UL (ref 1.4–7)
NEUTROPHILS NFR BLD AUTO: 57 %
PLATELET # BLD AUTO: 245 X10E3/UL (ref 150–450)
POTASSIUM SERPL-SCNC: 4.8 MMOL/L (ref 3.5–5.2)
PROT SERPL-MCNC: 6.5 G/DL (ref 6–8.5)
RBC # BLD AUTO: 4.47 X10E6/UL (ref 4.14–5.8)
SODIUM SERPL-SCNC: 139 MMOL/L (ref 134–144)
WBC # BLD AUTO: 6.2 X10E3/UL (ref 3.4–10.8)

## 2024-03-27 ENCOUNTER — OFFICE VISIT (OUTPATIENT)
Dept: FAMILY MEDICINE CLINIC | Facility: CLINIC | Age: 49
End: 2024-03-27
Payer: COMMERCIAL

## 2024-03-27 VITALS
SYSTOLIC BLOOD PRESSURE: 142 MMHG | WEIGHT: 275.3 LBS | OXYGEN SATURATION: 98 % | HEIGHT: 75 IN | RESPIRATION RATE: 15 BRPM | HEART RATE: 83 BPM | BODY MASS INDEX: 34.23 KG/M2 | DIASTOLIC BLOOD PRESSURE: 90 MMHG

## 2024-03-27 DIAGNOSIS — M10.271 ACUTE DRUG-INDUCED GOUT OF RIGHT FOOT: ICD-10-CM

## 2024-03-27 DIAGNOSIS — I10 PRIMARY HYPERTENSION: Primary | ICD-10-CM

## 2024-03-27 NOTE — PROGRESS NOTES
Follow Up Office Visit      Date:  2024   Patient Name: Christopher Cabrera  : 1975   MRN: [id number]     Chief Complaint:    Chief Complaint   Patient presents with    Hypertension     Follow up        History of Present Illness: Christopher Cabrera is a 49 y.o. male who is here today for hypertension follow-up after developing bilateral lower extremity edema.  Patient was treated with short course of diuretic accompanied by potassium supplementation and frequent BP monitoring. Pt denies side effects and reports blood pressures have been stable at home.     Subjective      Past Medical History: History reviewed. No pertinent past medical history.    Past Surgical History: History reviewed. No pertinent surgical history.    Family History:   Family History   Problem Relation Age of Onset    Diabetes Maternal Grandfather        Social History:   Social History     Socioeconomic History    Marital status:    Tobacco Use    Smoking status: Former     Current packs/day: 0.00     Average packs/day: 1 pack/day for 15.9 years (15.9 ttl pk-yrs)     Types: Cigarettes     Start date:      Quit date: 2023     Years since quittin.3    Smokeless tobacco: Never   Vaping Use    Vaping status: Never Used   Substance and Sexual Activity    Alcohol use: Yes     Alcohol/week: 1.0 standard drink of alcohol     Types: 1 Cans of beer per week    Drug use: Never       Medications:     Current Outpatient Medications:     cetirizine (ZyrTEC Allergy) 10 MG tablet, Take 1 tablet by mouth Daily., Disp: , Rfl:     colchicine 0.6 MG tablet, Take 1.2 mg PO x 1 dose (2 tablets), then 0.6 mg PO 1 hour later x 1 dose (1 tablet). Max: 1.8 mg total dose/treatment course (3 tablets). Do not repeat for at least 3 days., Disp: 3 tablet, Rfl: 1    furosemide (Lasix) 20 MG tablet, Take 1 tablet by mouth Daily., Disp: 10 tablet, Rfl: 0    metoprolol tartrate (LOPRESSOR) 25 MG tablet, Take 1 tablet by mouth 2 (Two) Times a Day.,  Disp: 60 tablet, Rfl: 1    multivitamin with minerals (MULTIVITAMIN ADULT PO), Take 1 tablet by mouth Daily., Disp: , Rfl:     omeprazole (priLOSEC) 40 MG capsule, Take 1 capsule by mouth Daily., Disp: 30 capsule, Rfl: 3    losartan (COZAAR) 100 MG tablet, Take 1 tablet by mouth Daily. (Patient not taking: Reported on 3/27/2024), Disp: 30 tablet, Rfl: 3    Allergies:   No Known Allergies    Objective     Physical Exam  Vitals and nursing note reviewed.   Constitutional:       General: He is not in acute distress.     Appearance: Normal appearance. He is not toxic-appearing.   HENT:      Head: Normocephalic.   Eyes:      Pupils: Pupils are equal, round, and reactive to light.   Cardiovascular:      Rate and Rhythm: Normal rate and regular rhythm.      Heart sounds: No murmur heard.  Pulmonary:      Effort: Pulmonary effort is normal. No respiratory distress.      Breath sounds: Normal breath sounds.   Musculoskeletal:         General: Normal range of motion.      Cervical back: Normal range of motion and neck supple.      Right lower le+ Edema present.      Left lower le+ Edema present.   Skin:     General: Skin is warm and dry.   Neurological:      Mental Status: He is alert.   Psychiatric:         Mood and Affect: Mood normal.         Behavior: Behavior normal.          The 10-year ASCVD risk score (Alex DK, et al., 2019) is: 3.8%    Values used to calculate the score:      Age: 49 years      Sex: Male      Is Non- : No      Diabetic: No      Tobacco smoker: No      Systolic Blood Pressure: 142 mmHg      Is BP treated: Yes      HDL Cholesterol: 72 mg/dL      Total Cholesterol: 204 mg/dL      Assessment / Plan      Assessment/Plan:   Diagnoses and all orders for this visit:    1. Primary hypertension (Primary)  Assessment & Plan:  Hypertension is uncontrolled, medication changes made at last visit to include increased diuretics secondary to bilateral lower extremity swelling.  Blood  pressure elevated today. Patient education materials attached to AVS related to HTN and BP log sheet. Materials were reviewed with patient during their office visit today and all questions addressed.  Medication changes per orders.  Ambulatory blood pressure monitoring.  Blood pressure will be reassessedin 2 weeks.      2. Acute drug-induced gout of right foot  Assessment & Plan:  No new gout flares since last visit.  No change in treatment regimen at this time, will reassess as needed      Most recent diagnostic testing results were reviewed and discussed with the patient during their appointment today and all questions addressed to patient's satisfaction.     Follow Up:   Return in about 4 weeks (around 4/24/2024).    LEDA Hernandes (Libby)  [facility] [place] [facility] [address] [address]    NOTE TO PATIENT: The 21st Century Cures Act makes medical notes like these available to patients in the interest of transparency. However, be advised this is a medical document. It is intended as peer to peer communication. It is written in medical language and may contain abbreviations or verbiage that are unfamiliar. It may appear blunt or direct. Medical documents are intended to carry relevant information, facts as evident, and the clinical opinion of the practitioner.

## 2024-04-04 NOTE — ASSESSMENT & PLAN NOTE
Hypertension is uncontrolled after making medication adjustment at last visit  Medication changes per orders.  Ambulatory blood pressure monitoring.  Blood pressure will be reassessedin 2 weeks.

## 2024-04-18 NOTE — ASSESSMENT & PLAN NOTE
No new gout flares since last visit.  No change in treatment regimen at this time, will reassess as needed

## 2024-04-18 NOTE — ASSESSMENT & PLAN NOTE
Hypertension is uncontrolled, medication changes made at last visit to include increased diuretics secondary to bilateral lower extremity swelling.  Blood pressure elevated today. Patient education materials attached to AVS related to HTN and BP log sheet. Materials were reviewed with patient during their office visit today and all questions addressed.  Medication changes per orders.  Ambulatory blood pressure monitoring.  Blood pressure will be reassessedin 2 weeks.

## 2024-04-23 ENCOUNTER — TELEPHONE (OUTPATIENT)
Dept: FAMILY MEDICINE CLINIC | Facility: CLINIC | Age: 49
End: 2024-04-23
Payer: COMMERCIAL

## 2024-04-24 ENCOUNTER — OFFICE VISIT (OUTPATIENT)
Dept: FAMILY MEDICINE CLINIC | Facility: CLINIC | Age: 49
End: 2024-04-24
Payer: COMMERCIAL

## 2024-04-24 VITALS
HEART RATE: 73 BPM | SYSTOLIC BLOOD PRESSURE: 128 MMHG | OXYGEN SATURATION: 98 % | RESPIRATION RATE: 15 BRPM | HEIGHT: 75 IN | BODY MASS INDEX: 33.74 KG/M2 | WEIGHT: 271.4 LBS | DIASTOLIC BLOOD PRESSURE: 80 MMHG

## 2024-04-24 DIAGNOSIS — Z12.11 ENCOUNTER FOR SCREENING FOR MALIGNANT NEOPLASM OF COLON: ICD-10-CM

## 2024-04-24 DIAGNOSIS — K21.9 GASTROESOPHAGEAL REFLUX DISEASE WITHOUT ESOPHAGITIS: ICD-10-CM

## 2024-04-24 DIAGNOSIS — M10.9 ACUTE GOUT OF RIGHT FOOT, UNSPECIFIED CAUSE: ICD-10-CM

## 2024-04-24 DIAGNOSIS — I10 PRIMARY HYPERTENSION: Primary | ICD-10-CM

## 2024-04-24 RX ORDER — FUROSEMIDE 20 MG/1
20 TABLET ORAL DAILY
Qty: 10 TABLET | Refills: 0 | Status: SHIPPED | OUTPATIENT
Start: 2024-04-24

## 2024-04-24 RX ORDER — COLCHICINE 0.6 MG/1
TABLET ORAL
Qty: 3 TABLET | Refills: 1 | Status: SHIPPED | OUTPATIENT
Start: 2024-04-24

## 2024-04-24 RX ORDER — OMEPRAZOLE 40 MG/1
40 CAPSULE, DELAYED RELEASE ORAL DAILY
Qty: 30 CAPSULE | Refills: 3 | Status: SHIPPED | OUTPATIENT
Start: 2024-04-24

## 2024-04-24 RX ORDER — LOSARTAN POTASSIUM AND HYDROCHLOROTHIAZIDE 12.5; 1 MG/1; MG/1
1 TABLET ORAL DAILY
Qty: 90 TABLET | Refills: 1 | Status: SHIPPED | OUTPATIENT
Start: 2024-04-24

## 2024-04-24 RX ORDER — LOSARTAN POTASSIUM AND HYDROCHLOROTHIAZIDE 12.5; 1 MG/1; MG/1
1 TABLET ORAL DAILY
COMMUNITY
Start: 2024-03-29 | End: 2024-04-24 | Stop reason: SDUPTHER

## 2024-05-29 DIAGNOSIS — I10 PRIMARY HYPERTENSION: ICD-10-CM

## 2024-08-05 DIAGNOSIS — I10 PRIMARY HYPERTENSION: ICD-10-CM

## 2024-09-16 DIAGNOSIS — I10 PRIMARY HYPERTENSION: ICD-10-CM

## 2024-09-16 RX ORDER — METOPROLOL TARTRATE 25 MG/1
25 TABLET, FILM COATED ORAL 2 TIMES DAILY
Qty: 60 TABLET | Refills: 0 | Status: SHIPPED | OUTPATIENT
Start: 2024-09-16

## 2024-10-28 DIAGNOSIS — I10 PRIMARY HYPERTENSION: ICD-10-CM

## 2024-10-30 RX ORDER — LOSARTAN POTASSIUM AND HYDROCHLOROTHIAZIDE 12.5; 1 MG/1; MG/1
1 TABLET ORAL DAILY
Qty: 90 TABLET | Refills: 1 | Status: SHIPPED | OUTPATIENT
Start: 2024-10-30

## 2024-10-30 RX ORDER — METOPROLOL TARTRATE 25 MG/1
25 TABLET, FILM COATED ORAL 2 TIMES DAILY
Qty: 60 TABLET | Refills: 0 | Status: SHIPPED | OUTPATIENT
Start: 2024-10-30

## 2024-11-25 ENCOUNTER — TELEPHONE (OUTPATIENT)
Dept: FAMILY MEDICINE CLINIC | Facility: CLINIC | Age: 49
End: 2024-11-25
Payer: COMMERCIAL

## 2024-11-25 DIAGNOSIS — I10 PRIMARY HYPERTENSION: ICD-10-CM

## 2024-11-25 RX ORDER — METOPROLOL TARTRATE 25 MG/1
25 TABLET, FILM COATED ORAL 2 TIMES DAILY
Qty: 60 TABLET | Refills: 0 | Status: SHIPPED | OUTPATIENT
Start: 2024-11-25

## 2024-11-25 NOTE — TELEPHONE ENCOUNTER
Caller: Christopher Cabrera    Relationship: Self    Best call back number: [phone]     Requested Prescriptions:   Requested Prescriptions     Pending Prescriptions Disp Refills    metoprolol tartrate (LOPRESSOR) 25 MG tablet 60 tablet 0     Sig: Take 1 tablet by mouth 2 (Two) Times a Day.        Pharmacy where request should be sent: [facility] [id number] [place], [place] - [address] [facility] [address] AT [facility] [address] - [phone]  - [phone] FX     Last office visit with prescribing clinician: 4/24/2024   Last telemedicine visit with prescribing clinician: Visit date not found   Next office visit with prescribing clinician: 12/12/2024     Additional details provided by patient: PATIENT HAS 2 DAYS LEFT OF THIS MEDICATION.    Does the patient have less than a 3 day supply:  [x] Yes  [] No    Would you like a call back once the refill request has been completed: [] Yes [x] No    If the office needs to give you a call back, can they leave a voicemail: [] Yes [x] No    Phill Lion Rep   11/25/24 09:56 EST

## 2024-11-25 NOTE — TELEPHONE ENCOUNTER
Chief Complaint   Patient presents with   • Nausea/Vomiting/Diarrhea     x 3 days. Denies blood in emesis or stool.  Reports hx of similar symptoms.    Pt reports in previous episodes with similar symptoms he was hospitalized for sepsis of unknown origin.   + thc use     HUB TO RELAY    PLEASE LET PATIENT KNOW HIS APPOINTMENT WITH SAMANTHA TOMORROW IS NEEDING TO BE RESCHEDULED. SHE IS OUT OF OFFICE ALL WEEK DUE TO COVID.     PLEASE RESCHEDULE PATIENT FOR PROVIDERS NEXT AVAILABLE APPOINTMENT      I LEFT PATIENT A VOICEMAIL MAKING HIM AWARE

## 2024-11-25 NOTE — TELEPHONE ENCOUNTER
Name: Christopher Cabrear      Relationship: Self      Best Callback Number: [phone]       HUB PROVIDED THE RELAY MESSAGE FROM THE OFFICE      PATIENT: SCHEDULED PER NOTE

## 2025-01-02 ENCOUNTER — OFFICE VISIT (OUTPATIENT)
Dept: FAMILY MEDICINE CLINIC | Facility: CLINIC | Age: 50
End: 2025-01-02
Payer: COMMERCIAL

## 2025-01-02 VITALS
BODY MASS INDEX: 34.32 KG/M2 | HEIGHT: 75 IN | TEMPERATURE: 98.4 F | WEIGHT: 276 LBS | DIASTOLIC BLOOD PRESSURE: 82 MMHG | OXYGEN SATURATION: 97 % | HEART RATE: 81 BPM | RESPIRATION RATE: 18 BRPM | SYSTOLIC BLOOD PRESSURE: 126 MMHG

## 2025-01-02 DIAGNOSIS — I10 PRIMARY HYPERTENSION: Primary | ICD-10-CM

## 2025-01-02 DIAGNOSIS — E66.9 OBESITY (BMI 30-39.9): ICD-10-CM

## 2025-01-02 DIAGNOSIS — K21.9 GASTROESOPHAGEAL REFLUX DISEASE WITHOUT ESOPHAGITIS: ICD-10-CM

## 2025-01-02 DIAGNOSIS — M10.9 ACUTE GOUT OF RIGHT FOOT, UNSPECIFIED CAUSE: ICD-10-CM

## 2025-01-02 DIAGNOSIS — N52.8 OTHER MALE ERECTILE DYSFUNCTION: ICD-10-CM

## 2025-01-02 PROCEDURE — 99214 OFFICE O/P EST MOD 30 MIN: CPT

## 2025-01-02 RX ORDER — FUROSEMIDE 20 MG/1
20 TABLET ORAL DAILY
Qty: 10 TABLET | Refills: 0 | Status: CANCELLED | OUTPATIENT
Start: 2025-01-02

## 2025-01-02 RX ORDER — LOSARTAN POTASSIUM AND HYDROCHLOROTHIAZIDE 25; 100 MG/1; MG/1
1 TABLET ORAL DAILY
Qty: 90 TABLET | Refills: 1 | Status: SHIPPED | OUTPATIENT
Start: 2025-01-02

## 2025-01-02 RX ORDER — METOPROLOL TARTRATE 25 MG/1
25 TABLET, FILM COATED ORAL 2 TIMES DAILY
Qty: 60 TABLET | Refills: 0 | Status: SHIPPED | OUTPATIENT
Start: 2025-01-02

## 2025-01-02 RX ORDER — OMEPRAZOLE 40 MG/1
40 CAPSULE, DELAYED RELEASE ORAL DAILY
Qty: 90 CAPSULE | Refills: 1 | Status: SHIPPED | OUTPATIENT
Start: 2025-01-02

## 2025-01-02 RX ORDER — LOSARTAN POTASSIUM AND HYDROCHLOROTHIAZIDE 12.5; 1 MG/1; MG/1
1 TABLET ORAL DAILY
Qty: 90 TABLET | Refills: 1 | Status: SHIPPED | OUTPATIENT
Start: 2025-01-02 | End: 2025-01-02 | Stop reason: DRUGHIGH

## 2025-01-02 NOTE — ASSESSMENT & PLAN NOTE
Denies gout flares since April  Patient has as needed medications for gout  No change in treatment regimen at this time

## 2025-01-02 NOTE — ASSESSMENT & PLAN NOTE
Ongoing issues with erectile dysfunction  Difficulty achieving and maintaining erection  Ongoing for some time, patient attributed it to hypertension medications  Discussed potential causes of ED  Patient requests medication for ED today  Patient education materials attached to AVS related to Cialis.   Materials were reviewed with patient during his office visit today including the following:  May take medication as directed 30 minutes to 3 hours prior to sex (36 hours for Cialis) and not to take with nitrates.   Do not take more than max dose in a 24 hour period - 20 MG for Cialis   Do not take if you have any chest pain or shortness of breath walking upstairs and see a provider.   If erection lasting more than 3 to 4 hours, go to ER to avoid irreversible damage

## 2025-01-02 NOTE — PROGRESS NOTES
Office Visit      Date:  2025   Patient Name: Christopher Cabrera  : 1975   MRN: [id number]     Chief Complaint   Patient presents with    Hypertension       History of Present Illness: Christopher Cabrera is a 49 y.o. male who is here today for hypertension follow-up.  He has been somewhat lost to follow-up since 2024.  He denies any recent gout flares since his last one in April when we changed his blood pressure medicines and decreased his hydrochlorothiazide.  He acknowledges that he received a call to set up his colonoscopy but did not follow-up.  He is concerned about mild swelling in his ankles particularly his left and ongoing issues with ED.  Otherwise patient denies any new illness or injury since last visit. Denies falls, unexplained weight change, fevers, chills, or other constitutional symptoms and has no additional questions or concens at this time.    Subjective      History reviewed. No pertinent past medical history.    History reviewed. No pertinent surgical history.    Family History   Problem Relation Age of Onset    Diabetes Maternal Grandfather        Social History     Socioeconomic History    Marital status:    Tobacco Use    Smoking status: Former     Current packs/day: 0.00     Average packs/day: 1 pack/day for 15.9 years (15.9 ttl pk-yrs)     Types: Cigarettes     Start date:      Quit date: 2023     Years since quittin.0     Passive exposure: Past    Smokeless tobacco: Never   Vaping Use    Vaping status: Never Used   Substance and Sexual Activity    Alcohol use: Yes     Alcohol/week: 1.0 standard drink of alcohol     Types: 1 Cans of beer per week    Drug use: Never    Sexual activity: Yes       Current Outpatient Medications   Medication Instructions    colchicine 0.6 MG tablet Take 1.2 mg PO x 1 dose (2 tablets), then 0.6 mg PO 1 hour later x 1 dose (1 tablet). Max: 1.8 mg total dose/treatment course (3 tablets). Do not repeat for at least 3 days.     "furosemide (LASIX) 20 mg, Oral, Daily    losartan-hydrochlorothiazide (HYZAAR) 100-25 MG per tablet 1 tablet, Oral, Daily    metoprolol tartrate (LOPRESSOR) 25 mg, Oral, 2 Times Daily    multivitamin with minerals (MULTIVITAMIN ADULT PO) 1 tablet, Every 24 Hours    omeprazole (PRILOSEC) 40 mg, Oral, Daily       No Known Allergies    Objective     Physical Exam  Vitals and nursing note reviewed.   Constitutional:       General: He is not in acute distress.     Appearance: Normal appearance. He is not toxic-appearing.   HENT:      Head: Normocephalic.   Eyes:      Pupils: Pupils are equal, round, and reactive to light.   Cardiovascular:      Rate and Rhythm: Normal rate and regular rhythm.      Heart sounds: No murmur heard.  Pulmonary:      Effort: Pulmonary effort is normal. No respiratory distress.      Breath sounds: Normal breath sounds.   Musculoskeletal:         General: Normal range of motion.      Cervical back: Normal range of motion and neck supple.      Right lower le+ Edema present.      Left lower le+ Edema present.   Skin:     General: Skin is warm and dry.   Neurological:      Mental Status: He is alert.   Psychiatric:         Mood and Affect: Mood normal.         Behavior: Behavior normal.       Vitals:    25 1035   BP: 126/82   BP Location: Right arm   Patient Position: Sitting   Cuff Size: Large Adult   Pulse: 81   Resp: 18   Temp: 98.4 °F (36.9 °C)   TempSrc: Oral   SpO2: 97%   Weight: 125 kg (276 lb)   Height: 190.5 cm (75\")   PainSc: 0-No pain     Body mass index is 34.5 kg/m².     No results found for this or any previous visit.     The 10-year ASCVD risk score (Alex DK, et al., 2019) is: 3.1%    Values used to calculate the score:      Age: 49 years      Sex: Male      Is Non- : No      Diabetic: No      Tobacco smoker: No      Systolic Blood Pressure: 126 mmHg      Is BP treated: Yes      HDL Cholesterol: 72 mg/dL      Total Cholesterol: 204 mg/dL     "     Assessment / Plan      Diagnoses and all orders for this visit:    1. Primary hypertension (Primary)  Assessment & Plan:  Hypertension improving with treatment  Taking medications as directed   Denies missed doses, denies side effects   Does not report any headaches, blurry vision, dizziness, chest pain, shortness of breath, or palpitations.    Patient agrees to increase hydrochlorothiazide even though we had decreased and stopped it previously due to gout.  Patient is concerned about 1+ edema bilaterally and would like to increase the hydrochlorothiazide and treat gout flares episodically if they arise.    Plan:  Change current medications, see orders  Continue diet and lifestyle changes  Recommend low sodium diet, moderate daily walking at least 30 minutes a day 5 days a week  Report back to the office if blood pressures consistently >140/90  Patient verbalizes understanding if he develops chest pain, shortness of breath or other alarm symptoms they should call 911 or go to the ER as appropriate.    Orders:  -     Discontinue: losartan-hydrochlorothiazide (HYZAAR) 100-12.5 MG per tablet; Take 1 tablet by mouth Daily.  Dispense: 90 tablet; Refill: 1  -     metoprolol tartrate (LOPRESSOR) 25 MG tablet; Take 1 tablet by mouth 2 (Two) Times a Day.  Dispense: 60 tablet; Refill: 0  -     CBC Auto Differential; Future  -     Comprehensive Metabolic Panel; Future  -     Hemoglobin A1c; Future  -     Lipid Panel; Future  -     TSH Rfx On Abnormal To Free T4; Future    2. Gastroesophageal reflux disease without esophagitis  Assessment & Plan:  Patient c/o GERD symptoms stable - heartburn/regurgitation  Denies cough, shortness of breath, sore throat, changes in taste  No dysphagia or chest pain  No suspected complications-(Morgan/stricture)  Recommended lifestyle modification: wgt loss, avoid meals 2 to 3 hours before bedtime  Consider eliminating food triggers: Chocolate, caffeine, EtOH, acid/spicy foods  Patient  education materials attached to AVS   No change in treatment regimen at this time    Orders:  -     omeprazole (priLOSEC) 40 MG capsule; Take 1 capsule by mouth Daily.  Dispense: 90 capsule; Refill: 1    3. Acute gout of right foot, unspecified cause  Assessment & Plan:  Denies gout flares since April  Patient has as needed medications for gout  No change in treatment regimen at this time      4. Other male erectile dysfunction  Assessment & Plan:  Ongoing issues with erectile dysfunction  Difficulty achieving and maintaining erection  Ongoing for some time, patient attributed it to hypertension medications  Discussed potential causes of ED  Patient requests medication for ED today  Patient education materials attached to AVS related to Cialis.   Materials were reviewed with patient during his office visit today including the following:  May take medication as directed 30 minutes to 3 hours prior to sex (36 hours for Cialis) and not to take with nitrates.   Do not take more than max dose in a 24 hour period - 20 MG for Cialis   Do not take if you have any chest pain or shortness of breath walking upstairs and see a provider.   If erection lasting more than 3 to 4 hours, go to ER to avoid irreversible damage      5. Obesity (BMI 30-39.9)  Assessment & Plan:  Patient's (Body mass index is 34.5 kg/m².) indicates that they are obese (BMI >30) with health conditions that include hypertension, dyslipidemias, and GERD . Weight is unchanged. BMI  is above average; BMI management plan is completed. We discussed portion control, increasing exercise, and Information on healthy weight added to patient's after visit summary.       Other orders  -     losartan-hydrochlorothiazide (HYZAAR) 100-25 MG per tablet; Take 1 tablet by mouth Daily.  Dispense: 90 tablet; Refill: 1    Patient not fasting today, agrees to return for fasting lab work next week and return in 2 months to complete annual wellness exam.    Patient Education:    Reviewed medications, potential side effects and signs and symptoms to report.   Discussed risk vs benefits of treatment plan with patient including medications, labs, and imaging.    Patient education materials attached to AVS and reviewed with patient during office visit today.   Addressed questions and concerns during visit. Patient verbalized understanding and agrees with tx plan.   Instructed to call the office with any questions and report to ER with any life-threatening symptoms.    Return in about 2 months (around 3/2/2025).    LEDA Hernandes (Libby)  [facility]   [address] [address]  [phone]    NOTE TO PATIENT: The 21st Century Cures Act makes medical notes like these available to patients in the interest of transparency. However, be advised this is a medical document. It is intended as peer to peer communication. It is written in medical language and may contain abbreviations or verbiage that are unfamiliar. It may appear blunt or direct. Medical documents are intended to carry relevant information, facts as evident, and the clinical opinion of the practitioner.      EMR Dragon/Transcription disclaimer: Much of this encounter note is an electronic transcription of spoken language to printed text. Electronic transcription of spoken language may permit erroneous, or at times, nonsensical words or phrases to be inadvertently transcribed. Although I have reviewed the note for such errors, some may still exist.

## 2025-01-02 NOTE — ASSESSMENT & PLAN NOTE
Patient c/o GERD symptoms stable - heartburn/regurgitation  Denies cough, shortness of breath, sore throat, changes in taste  No dysphagia or chest pain  No suspected complications-(Morgan/stricture)  Recommended lifestyle modification: wgt loss, avoid meals 2 to 3 hours before bedtime  Consider eliminating food triggers: Chocolate, caffeine, EtOH, acid/spicy foods  Patient education materials attached to AVS   No change in treatment regimen at this time

## 2025-01-02 NOTE — ASSESSMENT & PLAN NOTE
Patient's (Body mass index is 34.5 kg/m².) indicates that they are obese (BMI >30) with health conditions that include hypertension, dyslipidemias, and GERD . Weight is unchanged. BMI  is above average; BMI management plan is completed. We discussed portion control, increasing exercise, and Information on healthy weight added to patient's after visit summary.

## 2025-01-02 NOTE — ASSESSMENT & PLAN NOTE
Hypertension improving with treatment  Taking medications as directed   Denies missed doses, denies side effects   Does not report any headaches, blurry vision, dizziness, chest pain, shortness of breath, or palpitations.    Patient agrees to increase hydrochlorothiazide even though we had decreased and stopped it previously due to gout.  Patient is concerned about 1+ edema bilaterally and would like to increase the hydrochlorothiazide and treat gout flares episodically if they arise.    Plan:  Change current medications, see orders  Continue diet and lifestyle changes  Recommend low sodium diet, moderate daily walking at least 30 minutes a day 5 days a week  Report back to the office if blood pressures consistently >140/90  Patient verbalizes understanding if he develops chest pain, shortness of breath or other alarm symptoms they should call 911 or go to the ER as appropriate.

## 2025-01-03 RX ORDER — TADALAFIL 5 MG/1
5 TABLET ORAL DAILY PRN
Qty: 30 TABLET | Refills: 1 | Status: SHIPPED | OUTPATIENT
Start: 2025-01-03

## 2025-02-02 DIAGNOSIS — I10 PRIMARY HYPERTENSION: ICD-10-CM

## 2025-02-03 RX ORDER — METOPROLOL TARTRATE 25 MG/1
25 TABLET, FILM COATED ORAL 2 TIMES DAILY
Qty: 60 TABLET | Refills: 0 | Status: SHIPPED | OUTPATIENT
Start: 2025-02-03

## 2025-03-07 DIAGNOSIS — I10 PRIMARY HYPERTENSION: ICD-10-CM

## 2025-03-07 RX ORDER — METOPROLOL TARTRATE 25 MG/1
25 TABLET, FILM COATED ORAL 2 TIMES DAILY
Qty: 60 TABLET | Refills: 0 | Status: SHIPPED | OUTPATIENT
Start: 2025-03-07

## 2025-04-09 DIAGNOSIS — I10 PRIMARY HYPERTENSION: ICD-10-CM

## 2025-04-09 RX ORDER — METOPROLOL TARTRATE 25 MG/1
25 TABLET, FILM COATED ORAL 2 TIMES DAILY
Qty: 60 TABLET | Refills: 0 | Status: SHIPPED | OUTPATIENT
Start: 2025-04-09

## 2025-08-20 ENCOUNTER — OFFICE VISIT (OUTPATIENT)
Dept: FAMILY MEDICINE CLINIC | Facility: CLINIC | Age: 50
End: 2025-08-20
Payer: COMMERCIAL

## 2025-08-20 VITALS
DIASTOLIC BLOOD PRESSURE: 99 MMHG | RESPIRATION RATE: 18 BRPM | HEART RATE: 82 BPM | HEIGHT: 75 IN | BODY MASS INDEX: 37.04 KG/M2 | OXYGEN SATURATION: 99 % | TEMPERATURE: 97.9 F | WEIGHT: 297.9 LBS | SYSTOLIC BLOOD PRESSURE: 138 MMHG

## 2025-08-20 DIAGNOSIS — Z13.1 SCREENING FOR DIABETES MELLITUS (DM): ICD-10-CM

## 2025-08-20 DIAGNOSIS — Z13.29 SCREENING FOR THYROID DISORDER: ICD-10-CM

## 2025-08-20 DIAGNOSIS — Z00.00 ANNUAL PHYSICAL EXAM: Primary | ICD-10-CM

## 2025-08-20 DIAGNOSIS — M10.9 ACUTE GOUT OF RIGHT FOOT, UNSPECIFIED CAUSE: ICD-10-CM

## 2025-08-20 PROCEDURE — 99396 PREV VISIT EST AGE 40-64: CPT

## 2025-08-20 RX ORDER — ALPRAZOLAM 0.5 MG
0.5 TABLET ORAL NIGHTLY PRN
COMMUNITY
Start: 2025-05-08

## 2025-08-20 RX ORDER — COLCHICINE 0.6 MG/1
TABLET ORAL
Qty: 3 TABLET | Refills: 1 | Status: SHIPPED | OUTPATIENT
Start: 2025-08-20

## 2025-08-20 RX ORDER — LOSARTAN POTASSIUM AND HYDROCHLOROTHIAZIDE 25; 100 MG/1; MG/1
1 TABLET ORAL DAILY
Qty: 90 TABLET | Refills: 1 | Status: SHIPPED | OUTPATIENT
Start: 2025-08-20

## 2025-08-21 LAB
ALBUMIN SERPL-MCNC: 4.3 G/DL (ref 3.8–4.9)
ALP SERPL-CCNC: 93 IU/L (ref 44–121)
ALT SERPL-CCNC: 26 IU/L (ref 0–44)
AST SERPL-CCNC: 31 IU/L (ref 0–40)
BASOPHILS # BLD AUTO: 0 X10E3/UL (ref 0–0.2)
BASOPHILS NFR BLD AUTO: 1 %
BILIRUB SERPL-MCNC: 0.5 MG/DL (ref 0–1.2)
BUN SERPL-MCNC: 8 MG/DL (ref 6–24)
BUN/CREAT SERPL: 8 (ref 9–20)
CALCIUM SERPL-MCNC: 9.1 MG/DL (ref 8.7–10.2)
CHLORIDE SERPL-SCNC: 103 MMOL/L (ref 96–106)
CHOLEST SERPL-MCNC: 216 MG/DL (ref 100–199)
CO2 SERPL-SCNC: 19 MMOL/L (ref 20–29)
CREAT SERPL-MCNC: 0.95 MG/DL (ref 0.76–1.27)
EGFRCR SERPLBLD CKD-EPI 2021: 96 ML/MIN/1.73
EOSINOPHIL # BLD AUTO: 0.1 X10E3/UL (ref 0–0.4)
EOSINOPHIL NFR BLD AUTO: 2 %
ERYTHROCYTE [DISTWIDTH] IN BLOOD BY AUTOMATED COUNT: 12.5 % (ref 11.6–15.4)
GLOBULIN SER CALC-MCNC: 2.7 G/DL (ref 1.5–4.5)
GLUCOSE SERPL-MCNC: 111 MG/DL (ref 70–99)
HBA1C MFR BLD: 5.6 % (ref 4.8–5.6)
HCT VFR BLD AUTO: 43.2 % (ref 37.5–51)
HDLC SERPL-MCNC: 61 MG/DL
HGB BLD-MCNC: 14.4 G/DL (ref 13–17.7)
IMM GRANULOCYTES # BLD AUTO: 0 X10E3/UL (ref 0–0.1)
IMM GRANULOCYTES NFR BLD AUTO: 0 %
LDLC SERPL CALC-MCNC: 134 MG/DL (ref 0–99)
LYMPHOCYTES # BLD AUTO: 2 X10E3/UL (ref 0.7–3.1)
LYMPHOCYTES NFR BLD AUTO: 28 %
MCH RBC QN AUTO: 34.8 PG (ref 26.6–33)
MCHC RBC AUTO-ENTMCNC: 33.3 G/DL (ref 31.5–35.7)
MCV RBC AUTO: 104 FL (ref 79–97)
MONOCYTES # BLD AUTO: 0.7 X10E3/UL (ref 0.1–0.9)
MONOCYTES NFR BLD AUTO: 9 %
NEUTROPHILS # BLD AUTO: 4.3 X10E3/UL (ref 1.4–7)
NEUTROPHILS NFR BLD AUTO: 60 %
PLATELET # BLD AUTO: 272 X10E3/UL (ref 150–450)
POTASSIUM SERPL-SCNC: 4.5 MMOL/L (ref 3.5–5.2)
PROT SERPL-MCNC: 7 G/DL (ref 6–8.5)
RBC # BLD AUTO: 4.14 X10E6/UL (ref 4.14–5.8)
SODIUM SERPL-SCNC: 142 MMOL/L (ref 134–144)
TRIGL SERPL-MCNC: 120 MG/DL (ref 0–149)
TSH SERPL DL<=0.005 MIU/L-ACNC: 2.38 UIU/ML (ref 0.45–4.5)
URATE SERPL-MCNC: 11.3 MG/DL (ref 3.8–8.4)
VLDLC SERPL CALC-MCNC: 21 MG/DL (ref 5–40)
WBC # BLD AUTO: 7.1 X10E3/UL (ref 3.4–10.8)